# Patient Record
Sex: MALE | Race: OTHER | URBAN - METROPOLITAN AREA
[De-identification: names, ages, dates, MRNs, and addresses within clinical notes are randomized per-mention and may not be internally consistent; named-entity substitution may affect disease eponyms.]

---

## 2021-10-27 ENCOUNTER — INPATIENT (INPATIENT)
Facility: HOSPITAL | Age: 26
LOS: 1 days | Discharge: HOME | DRG: 558 | End: 2021-10-29
Payer: COMMERCIAL

## 2021-10-27 VITALS
WEIGHT: 154.98 LBS | RESPIRATION RATE: 17 BRPM | TEMPERATURE: 98 F | SYSTOLIC BLOOD PRESSURE: 107 MMHG | OXYGEN SATURATION: 97 % | HEIGHT: 75 IN | DIASTOLIC BLOOD PRESSURE: 66 MMHG | HEART RATE: 79 BPM

## 2021-10-27 PROCEDURE — 99285 EMERGENCY DEPT VISIT HI MDM: CPT

## 2021-10-27 PROCEDURE — 93010 ELECTROCARDIOGRAM REPORT: CPT

## 2021-10-27 RX ORDER — ONDANSETRON 8 MG/1
4 TABLET, FILM COATED ORAL EVERY 8 HOURS
Refills: 0 | Status: DISCONTINUED | OUTPATIENT
Start: 2021-10-27 | End: 2021-10-29

## 2021-10-27 RX ORDER — LANOLIN ALCOHOL/MO/W.PET/CERES
5 CREAM (GRAM) TOPICAL AT BEDTIME
Refills: 0 | Status: DISCONTINUED | OUTPATIENT
Start: 2021-10-27 | End: 2021-10-29

## 2021-10-27 RX ORDER — SODIUM CHLORIDE 9 MG/ML
1000 INJECTION INTRAMUSCULAR; INTRAVENOUS; SUBCUTANEOUS ONCE
Refills: 0 | Status: COMPLETED | OUTPATIENT
Start: 2021-10-27 | End: 2021-10-27

## 2021-10-27 RX ADMIN — SODIUM CHLORIDE 1000 MILLILITER(S): 9 INJECTION INTRAMUSCULAR; INTRAVENOUS; SUBCUTANEOUS at 23:50

## 2021-10-27 RX ADMIN — SODIUM CHLORIDE 1000 MILLILITER(S): 9 INJECTION INTRAMUSCULAR; INTRAVENOUS; SUBCUTANEOUS at 22:02

## 2021-10-27 RX ADMIN — SODIUM CHLORIDE 1000 MILLILITER(S): 9 INJECTION INTRAMUSCULAR; INTRAVENOUS; SUBCUTANEOUS at 23:53

## 2021-10-27 NOTE — ED PROVIDER NOTE - OBJECTIVE STATEMENT
26M with no sig PMHx who was sent in from  after he went there 3 days ago for muscle soreness after he worked out heavily one week ago. Pt also admits to not drinking a lot of water and notes dark urine. he was Rx naproxen at  which he has been taking for muscle aches. No f/c, no cp/sob, no n/v, no abd pain, no ha or neck pain, no dizziness or syncope, no n/t/w in ext. No other complaints.

## 2021-10-27 NOTE — ED ADULT TRIAGE NOTE - CHIEF COMPLAINT QUOTE
pt sent from  for creatine kinase >5000. pt went to  monday night for muscle aches and got lab results back today. offers no complaints, muscle aches relieved with naproxen

## 2021-10-27 NOTE — ED PROVIDER NOTE - CLINICAL SUMMARY MEDICAL DECISION MAKING FREE TEXT BOX
26M with no sig PMHx who was sent in from  after he went there 3 days ago for muscle soreness after he worked out heavily one week ago. Pt also admits to not drinking a lot of water and notes dark urine. he was Rx naproxen at  which he has been taking for muscle aches. No f/c, no cp/sob, no n/v, no abd pain, no ha or neck pain, no dizziness or syncope, no n/t/w in ext. No other complaints.    Pt is well-appearing on exam, VSS, no focal neuro deficits, EKG NSR, no ischemia.  Will check labs and hydrate.  Dispo pending workup 26M with no sig PMHx who was sent in from  after he went there 3 days ago for muscle soreness after he worked out heavily one week ago. Pt also admits to not drinking a lot of water and notes dark urine. he was Rx naproxen at  which he has been taking for muscle aches. No f/c, no cp/sob, no n/v, no abd pain, no ha or neck pain, no dizziness or syncope, no n/t/w in ext. No other complaints.    Pt is well-appearing on exam, VSS, no focal neuro deficits, EKG NSR, no ischemia.  Will check labs and hydrate.  Dispo pending workup    CK and LFTs elevated, pt will require admission for continued hydration and trending of CK, LFTs and kidney function.

## 2021-10-27 NOTE — ED ADULT NURSE NOTE - OBJECTIVE STATEMENT
Pt, with no PMH, presents to ER with generalized body aches s/p lifting weights on Thursday night. Pt went to  and was notified to come to ER d/t elevated CK result. Pt reports "tea colored" urine. Denies any other associated symptoms at this time.

## 2021-10-27 NOTE — ED ADULT NURSE REASSESSMENT NOTE - NS ED NURSE REASSESS COMMENT FT1
Pt ambulatory to restroom and back to stretcher with steady gait, clear yellow urine sample collected, sent to lab, waiting results.

## 2021-10-27 NOTE — ED ADULT NURSE REASSESSMENT NOTE - NS ED NURSE REASSESS COMMENT FT1
Pt, with no PMH, presents to ER room 17 with generalized body aches s/p lifting weights on Thursday night. Pt went to  and was notified to come to ER d/t elevated CK result. Pt reports "tea colored" urine. Denies any other associated symptoms at this time. Assessment as noted, IV access established, labs drawn and sent, waiting results. EKG complete, presented to ER physician for interpretation.

## 2021-10-28 DIAGNOSIS — R74.01 ELEVATION OF LEVELS OF LIVER TRANSAMINASE LEVELS: ICD-10-CM

## 2021-10-28 DIAGNOSIS — R63.8 OTHER SYMPTOMS AND SIGNS CONCERNING FOOD AND FLUID INTAKE: ICD-10-CM

## 2021-10-28 DIAGNOSIS — M62.82 RHABDOMYOLYSIS: ICD-10-CM

## 2021-10-28 LAB
ALBUMIN SERPL ELPH-MCNC: 3.4 G/DL — SIGNIFICANT CHANGE UP (ref 3.3–5)
ALP SERPL-CCNC: 75 U/L — SIGNIFICANT CHANGE UP (ref 40–120)
ALT FLD-CCNC: 160 U/L — HIGH (ref 10–45)
AMPHET UR-MCNC: NEGATIVE — SIGNIFICANT CHANGE UP
ANION GAP SERPL CALC-SCNC: 4 MMOL/L — LOW (ref 5–17)
ANION GAP SERPL CALC-SCNC: 6 MMOL/L — SIGNIFICANT CHANGE UP (ref 5–17)
APPEARANCE UR: CLEAR — SIGNIFICANT CHANGE UP
AST SERPL-CCNC: 614 U/L — HIGH (ref 10–40)
BARBITURATES UR SCN-MCNC: NEGATIVE — SIGNIFICANT CHANGE UP
BASOPHILS # BLD AUTO: 0.04 K/UL — SIGNIFICANT CHANGE UP (ref 0–0.2)
BASOPHILS NFR BLD AUTO: 0.9 % — SIGNIFICANT CHANGE UP (ref 0–2)
BENZODIAZ UR-MCNC: NEGATIVE — SIGNIFICANT CHANGE UP
BILIRUB SERPL-MCNC: 0.4 MG/DL — SIGNIFICANT CHANGE UP (ref 0.2–1.2)
BILIRUB UR-MCNC: NEGATIVE — SIGNIFICANT CHANGE UP
BUN SERPL-MCNC: 12 MG/DL — SIGNIFICANT CHANGE UP (ref 7–23)
BUN SERPL-MCNC: 12 MG/DL — SIGNIFICANT CHANGE UP (ref 7–23)
CALCIUM SERPL-MCNC: 8.4 MG/DL — SIGNIFICANT CHANGE UP (ref 8.4–10.5)
CALCIUM SERPL-MCNC: 8.4 MG/DL — SIGNIFICANT CHANGE UP (ref 8.4–10.5)
CHLORIDE SERPL-SCNC: 110 MMOL/L — HIGH (ref 96–108)
CHLORIDE SERPL-SCNC: 111 MMOL/L — HIGH (ref 96–108)
CK SERPL-CCNC: CRITICAL HIGH U/L (ref 30–200)
CO2 SERPL-SCNC: 25 MMOL/L — SIGNIFICANT CHANGE UP (ref 22–31)
CO2 SERPL-SCNC: 26 MMOL/L — SIGNIFICANT CHANGE UP (ref 22–31)
COCAINE METAB.OTHER UR-MCNC: NEGATIVE — SIGNIFICANT CHANGE UP
COLOR SPEC: YELLOW — SIGNIFICANT CHANGE UP
COVID-19 SPIKE DOMAIN AB INTERP: POSITIVE
COVID-19 SPIKE DOMAIN ANTIBODY RESULT: >250 U/ML — HIGH
CREAT SERPL-MCNC: 0.89 MG/DL — SIGNIFICANT CHANGE UP (ref 0.5–1.3)
CREAT SERPL-MCNC: 0.9 MG/DL — SIGNIFICANT CHANGE UP (ref 0.5–1.3)
DIFF PNL FLD: NEGATIVE — SIGNIFICANT CHANGE UP
EOSINOPHIL # BLD AUTO: 0.31 K/UL — SIGNIFICANT CHANGE UP (ref 0–0.5)
EOSINOPHIL NFR BLD AUTO: 6.9 % — HIGH (ref 0–6)
GLUCOSE SERPL-MCNC: 91 MG/DL — SIGNIFICANT CHANGE UP (ref 70–99)
GLUCOSE SERPL-MCNC: 93 MG/DL — SIGNIFICANT CHANGE UP (ref 70–99)
GLUCOSE UR QL: NEGATIVE — SIGNIFICANT CHANGE UP
HCT VFR BLD CALC: 36.4 % — LOW (ref 39–50)
HCT VFR BLD CALC: 37.2 % — LOW (ref 39–50)
HGB BLD-MCNC: 12.1 G/DL — LOW (ref 13–17)
HGB BLD-MCNC: 12.2 G/DL — LOW (ref 13–17)
IMM GRANULOCYTES NFR BLD AUTO: 0.2 % — SIGNIFICANT CHANGE UP (ref 0–1.5)
KETONES UR-MCNC: NEGATIVE — SIGNIFICANT CHANGE UP
LEUKOCYTE ESTERASE UR-ACNC: NEGATIVE — SIGNIFICANT CHANGE UP
LYMPHOCYTES # BLD AUTO: 2.11 K/UL — SIGNIFICANT CHANGE UP (ref 1–3.3)
LYMPHOCYTES # BLD AUTO: 47 % — HIGH (ref 13–44)
MAGNESIUM SERPL-MCNC: 2 MG/DL — SIGNIFICANT CHANGE UP (ref 1.6–2.6)
MCHC RBC-ENTMCNC: 29.7 PG — SIGNIFICANT CHANGE UP (ref 27–34)
MCHC RBC-ENTMCNC: 29.7 PG — SIGNIFICANT CHANGE UP (ref 27–34)
MCHC RBC-ENTMCNC: 32.8 GM/DL — SIGNIFICANT CHANGE UP (ref 32–36)
MCHC RBC-ENTMCNC: 33.2 GM/DL — SIGNIFICANT CHANGE UP (ref 32–36)
MCV RBC AUTO: 89.2 FL — SIGNIFICANT CHANGE UP (ref 80–100)
MCV RBC AUTO: 90.5 FL — SIGNIFICANT CHANGE UP (ref 80–100)
METHADONE UR-MCNC: NEGATIVE — SIGNIFICANT CHANGE UP
MONOCYTES # BLD AUTO: 0.37 K/UL — SIGNIFICANT CHANGE UP (ref 0–0.9)
MONOCYTES NFR BLD AUTO: 8.2 % — SIGNIFICANT CHANGE UP (ref 2–14)
NEUTROPHILS # BLD AUTO: 1.65 K/UL — LOW (ref 1.8–7.4)
NEUTROPHILS NFR BLD AUTO: 36.8 % — LOW (ref 43–77)
NITRITE UR-MCNC: NEGATIVE — SIGNIFICANT CHANGE UP
NRBC # BLD: 0 /100 WBCS — SIGNIFICANT CHANGE UP (ref 0–0)
NRBC # BLD: 0 /100 WBCS — SIGNIFICANT CHANGE UP (ref 0–0)
OPIATES UR-MCNC: NEGATIVE — SIGNIFICANT CHANGE UP
PCP SPEC-MCNC: SIGNIFICANT CHANGE UP
PCP UR-MCNC: NEGATIVE — SIGNIFICANT CHANGE UP
PH UR: 7 — SIGNIFICANT CHANGE UP (ref 5–8)
PHOSPHATE SERPL-MCNC: 4 MG/DL — SIGNIFICANT CHANGE UP (ref 2.5–4.5)
PLATELET # BLD AUTO: 201 K/UL — SIGNIFICANT CHANGE UP (ref 150–400)
PLATELET # BLD AUTO: 203 K/UL — SIGNIFICANT CHANGE UP (ref 150–400)
POTASSIUM SERPL-MCNC: 4.3 MMOL/L — SIGNIFICANT CHANGE UP (ref 3.5–5.3)
POTASSIUM SERPL-MCNC: 4.5 MMOL/L — SIGNIFICANT CHANGE UP (ref 3.5–5.3)
POTASSIUM SERPL-SCNC: 4.3 MMOL/L — SIGNIFICANT CHANGE UP (ref 3.5–5.3)
POTASSIUM SERPL-SCNC: 4.5 MMOL/L — SIGNIFICANT CHANGE UP (ref 3.5–5.3)
PROT SERPL-MCNC: 5.6 G/DL — LOW (ref 6–8.3)
PROT UR-MCNC: NEGATIVE MG/DL — SIGNIFICANT CHANGE UP
RBC # BLD: 4.08 M/UL — LOW (ref 4.2–5.8)
RBC # BLD: 4.11 M/UL — LOW (ref 4.2–5.8)
RBC # FLD: 12.3 % — SIGNIFICANT CHANGE UP (ref 10.3–14.5)
RBC # FLD: 12.3 % — SIGNIFICANT CHANGE UP (ref 10.3–14.5)
SARS-COV-2 IGG+IGM SERPL QL IA: >250 U/ML — HIGH
SARS-COV-2 IGG+IGM SERPL QL IA: POSITIVE
SARS-COV-2 RNA SPEC QL NAA+PROBE: NEGATIVE — SIGNIFICANT CHANGE UP
SODIUM SERPL-SCNC: 141 MMOL/L — SIGNIFICANT CHANGE UP (ref 135–145)
SODIUM SERPL-SCNC: 141 MMOL/L — SIGNIFICANT CHANGE UP (ref 135–145)
SP GR SPEC: 1.01 — SIGNIFICANT CHANGE UP (ref 1–1.03)
THC UR QL: POSITIVE
UROBILINOGEN FLD QL: 0.2 E.U./DL — SIGNIFICANT CHANGE UP
WBC # BLD: 4.49 K/UL — SIGNIFICANT CHANGE UP (ref 3.8–10.5)
WBC # BLD: 4.51 K/UL — SIGNIFICANT CHANGE UP (ref 3.8–10.5)
WBC # FLD AUTO: 4.49 K/UL — SIGNIFICANT CHANGE UP (ref 3.8–10.5)
WBC # FLD AUTO: 4.51 K/UL — SIGNIFICANT CHANGE UP (ref 3.8–10.5)

## 2021-10-28 PROCEDURE — 99222 1ST HOSP IP/OBS MODERATE 55: CPT | Mod: GC

## 2021-10-28 RX ORDER — ACETAMINOPHEN 500 MG
650 TABLET ORAL EVERY 6 HOURS
Refills: 0 | Status: DISCONTINUED | OUTPATIENT
Start: 2021-10-28 | End: 2021-10-29

## 2021-10-28 RX ORDER — SODIUM CHLORIDE 9 MG/ML
1000 INJECTION INTRAMUSCULAR; INTRAVENOUS; SUBCUTANEOUS
Refills: 0 | Status: DISCONTINUED | OUTPATIENT
Start: 2021-10-28 | End: 2021-10-28

## 2021-10-28 RX ORDER — SODIUM CHLORIDE 9 MG/ML
1000 INJECTION INTRAMUSCULAR; INTRAVENOUS; SUBCUTANEOUS
Refills: 0 | Status: DISCONTINUED | OUTPATIENT
Start: 2021-10-28 | End: 2021-10-29

## 2021-10-28 RX ORDER — INFLUENZA VIRUS VACCINE 15; 15; 15; 15 UG/.5ML; UG/.5ML; UG/.5ML; UG/.5ML
0.5 SUSPENSION INTRAMUSCULAR ONCE
Refills: 0 | Status: DISCONTINUED | OUTPATIENT
Start: 2021-10-28 | End: 2021-10-29

## 2021-10-28 RX ADMIN — SODIUM CHLORIDE 300 MILLILITER(S): 9 INJECTION INTRAMUSCULAR; INTRAVENOUS; SUBCUTANEOUS at 18:27

## 2021-10-28 RX ADMIN — SODIUM CHLORIDE 200 MILLILITER(S): 9 INJECTION INTRAMUSCULAR; INTRAVENOUS; SUBCUTANEOUS at 00:31

## 2021-10-28 RX ADMIN — SODIUM CHLORIDE 300 MILLILITER(S): 9 INJECTION INTRAMUSCULAR; INTRAVENOUS; SUBCUTANEOUS at 10:34

## 2021-10-28 RX ADMIN — SODIUM CHLORIDE 200 MILLILITER(S): 9 INJECTION INTRAMUSCULAR; INTRAVENOUS; SUBCUTANEOUS at 07:18

## 2021-10-28 RX ADMIN — SODIUM CHLORIDE 200 MILLILITER(S): 9 INJECTION INTRAMUSCULAR; INTRAVENOUS; SUBCUTANEOUS at 02:56

## 2021-10-28 RX ADMIN — SODIUM CHLORIDE 300 MILLILITER(S): 9 INJECTION INTRAMUSCULAR; INTRAVENOUS; SUBCUTANEOUS at 11:56

## 2021-10-28 RX ADMIN — SODIUM CHLORIDE 300 MILLILITER(S): 9 INJECTION INTRAMUSCULAR; INTRAVENOUS; SUBCUTANEOUS at 22:26

## 2021-10-28 RX ADMIN — SODIUM CHLORIDE 300 MILLILITER(S): 9 INJECTION INTRAMUSCULAR; INTRAVENOUS; SUBCUTANEOUS at 15:22

## 2021-10-28 NOTE — H&P ADULT - PROBLEM SELECTOR PLAN 1
pt endorsing muscle pain/soreness after exercise. found to have CK > 72149, CKMB 42.2, dark urine, UA w.o blood or RBCs. differentials include trauma/muscle injury (most likely given hx and poor fluid intake vs myopathies vs thermal injuries vs drugs/toxins vs electrolyte ab vs endocrine disorders   -s/p 2L NS in ed  -c/w maintenance at 200cc/hr (titrate to goal -300cc/hr)  -f/u utox pt endorsing muscle pain/soreness after exercise. found to have CK > 36108, CKMB 42.2, dark urine, UA w.o blood or RBCs. differentials include trauma/muscle injury (most likely given hx and poor fluid intake vs myopathies vs thermal injuries vs drugs/toxins vs electrolyte ab vs endocrine disorders   -s/p 2L NS in ed  -c/w maintenance at 200cc/hr (titrate to goal -300cc/hr)  -monitor for signs of compartment syndrome   -f/u utox  -trend CK

## 2021-10-28 NOTE — H&P ADULT - PROBLEM SELECTOR PLAN 3
Fluids: s/p 2L NS, 200cc/hr maintenance   Electrolytes: Mg>2, K>4  Nutrition:  No IVF currently needed, replete lytes PRN  Prophylaxis: SCD  Activity: AAT, OOBTC  GI: none  C: FC  Dispo: Admit to F

## 2021-10-28 NOTE — DISCHARGE NOTE PROVIDER - NSDCCPCAREPLAN_GEN_ALL_CORE_FT
PRINCIPAL DISCHARGE DIAGNOSIS  Diagnosis: Rhabdomyolysis  Assessment and Plan of Treatment: You were found to have rhabdomyolysis, which is a condition that occurs when a large amount of muscle is damaged. When muscle fibers break down, they release their contents into the bloodstream. One of these is a protein called myoglobin. Myoglobin can damage the kidneys, so your kidney function was closely monitored throughout your admission. Other substances released by damaged muscles can cause chemical and fluid imbalance in the body.   Causes of rhabdomyolysis include:   What causes rhabdomyolysis?   Causes include:  - Extended overexertion of the muscles (such as during marathon running)  - Trauma (such as a car accident), especially crush injuries  - Blockage of an artery or vein that leads to muscle death (such as deep vein thrombosis)  - Certain prescription medicines, such as statins, or other drugs (including alcohol)  - Infection  - Heat stroke  - Prolonged immobilization  - Severe burns  - Metabolic imbalances  - Inflammatory muscle diseases such as polymyositis  - Certain genetic disorders  You received IV fluids to help prevent damage to your kidney, and to help excrete muscle breakdown products from your body. With early treatment, the kidneys often recover without long-term damage. Please follow up with your primary care provider.   Seek medical attention immediately if you have any of these symptoms:  - Swelling of the hands and feet  - Trouble breathing  - Abnormal heartbeat  - Unexplained bleeding      SECONDARY DISCHARGE DIAGNOSES  Diagnosis: Myalgia, upper arm  Assessment and Plan of Treatment:      PRINCIPAL DISCHARGE DIAGNOSIS  Diagnosis: Rhabdomyolysis  Assessment and Plan of Treatment: You were found to have rhabdomyolysis, which is a condition that occurs when a large amount of muscle is damaged. When muscle fibers break down, they release their contents into the bloodstream. One of these is a protein called myoglobin. Myoglobin can damage the kidneys, so your kidney function was closely monitored throughout your admission. Other substances released by damaged muscles can cause chemical and fluid imbalance in the body.   Causes of rhabdomyolysis include:   What causes rhabdomyolysis?   Causes include:  - Extended overexertion of the muscles (such as during marathon running)  - Trauma (such as a car accident), especially crush injuries  - Blockage of an artery or vein that leads to muscle death (such as deep vein thrombosis)  - Certain prescription medicines, such as statins, or other drugs (including alcohol)  - Infection  - Heat stroke  - Prolonged immobilization  - Severe burns  - Metabolic imbalances  - Inflammatory muscle diseases such as polymyositis  - Certain genetic disorders  You received IV fluids to help prevent damage to your kidney, and to help excrete muscle breakdown products from your body. With early treatment, the kidneys often recover without long-term damage. Please follow up with your primary care provider.   Seek medical attention immediately if you have any of these symptoms:  - Swelling of the hands and feet  - Trouble breathing  - Abnormal heartbeat  - Unexplained bleeding

## 2021-10-28 NOTE — H&P ADULT - HISTORY OF PRESENT ILLNESS
HPI: 26M with no sig PMHx who was sent in from  after he went there 3 days ago for muscle soreness after he worked out heavily one week ago. Pt also admits to not drinking a lot of water and notes dark urine. he was Rx naproxen at  which he has been taking for muscle aches. No f/c, no cp/sob, no n/v, no abd pain, no ha or neck pain, no dizziness or syncope, no n/t/w in ext. No other complaints.    In the ED:  Initial vital signs: T: 98F, HR: 79, BP: 107/66, R: 17, SpO2: 97% on RA  Labs: significant for cbc wnl, lytes wnl, AST//198, CK > 04573, CKMB 42.2, trop 0.01, UA wnl w.o signs of infection, no RBCs  Imaging: none   CXR: none  EKG: NSR   Medications: 2L NS  Consults: none      HPI: 26M with no sig PMHx who was sent in from  after he went there 3 days ago for muscle soreness after he worked out heavily one week ago. Pt also admits to not drinking a lot of water and notes dark urine. he was Rx naproxen at  which he has been taking for muscle aches. he denies drug use other than marijuana occasionally. denies hx of autoimmune conditions or myopathy. no pertinent family hx. ROS negative for f/c, no cp/sob, no n/v, no abd pain, no ha or neck pain, no dizziness or syncope, no n/t/w in ext. No other complaints.    In the ED:  Initial vital signs: T: 98F, HR: 79, BP: 107/66, R: 17, SpO2: 97% on RA  Labs: significant for cbc wnl, lytes wnl, AST//198, CK > 12425, CKMB 42.2, trop 0.01, UA wnl w.o signs of infection, no RBCs  Imaging: none   CXR: none  EKG: NSR   Medications: 2L NS  Consults: none

## 2021-10-28 NOTE — DISCHARGE NOTE PROVIDER - HOSPITAL COURSE
#Discharge: do not delete     Patient is 25 yo M with no known past medical history sent in from  after presenting there 3 days ago for muscle soreness and dark urine after working out heavily 1 week ago, found to have elevated CK >48,000, admitted to tx of rhabdomyolysis     Hospital course (by problem):     #Rhabdomyolysis  Pt endorsing muscle pain/soreness after exercising for first time in months (3 sets of 20x pushups), found to have CK > 74443, CKMB 42.2, dark urine, UA w/o blood or RBCs. Most likely 2/2 muscle injury i/s/o dehydration (given hx, poor fluid intake, and timing of onset of sx) vs less likely drug/toxin-induced vs unlikely immune vs inflammatory myopathy   -Pt received 2LNS in ED  -continued on NS at 200-300cc/hr  -no signs of compartment syndrome   -Utox +MJ, negative all other substances  -trend CK: 14522 (10/28)<-65627 (10/27)  -follow up CK as outpatient     #Transaminitis  AST//198 at presentation, likely 2/2 rhabdomyolysis vs less likely hepatitis, pt w/o abdominal complaints throughout admission   -management of rhabdomyolysis as above  -AST/ALT downtrendin/160 (10/28)<-828/198 (10/27)  -follow up as outpatient    Patient was discharged to: home    New medications: none  Changes to old medications: none  Medications that were stopped: none    Items to follow up as outpatient: BMP, CK     Physical exam at the time of discharge:    GENERAL: young male lying in bed comfortably in NAD  HEAD: Atraumatic, normocephalic  EYES: EOMI, PERRLA, conjunctiva and sclera clear  ENT: Moist mucous membranes  NECK: Supple, no JVD  CHEST/LUNG: CTAB; no rales, rhonchi, wheezing, or rubs. Unlabored respirations.  HEART: Regular rate and rhythm; no murmurs, rubs, or gallops  ABDOMEN: BS normoactive x4 quadrants; soft, nontender, nondistended  EXTREMITIES: 2+ peripheral pulses, brisk capillary refill. No clubbing, cyanosis, or edema  NERVOUS SYSTEM: AOx3, no focal deficits, CN II-XII grossly intact  SKIN: No rashes or lesions noted       #Discharge: do not delete     Patient is 25 yo M with no known past medical history sent in from  after presenting there 3 days ago for muscle soreness and dark urine after working out heavily 1 week ago, found to have elevated CK >48,000, admitted to tx of rhabdomyolysis     Hospital course (by problem):     #Rhabdomyolysis  Pt endorsing muscle pain/soreness after exercising for first time in months (3 sets of 20x pushups), found to have CK > 16981, CKMB 42.2, dark urine, UA w/o blood or RBCs. Most likely 2/2 muscle injury i/s/o dehydration (given hx, poor fluid intake, and timing of onset of sx) vs less likely drug/toxin-induced vs unlikely immune vs inflammatory myopathy   -Pt received 2LNS in ED  -continued on NS at 200-300cc/hr  -no signs of compartment syndrome   -Utox +MJ, negative all other substances  -trend CK: 26,330 (10/29)<-01735 (10/28)<-01154 (10/27)  -follow up CK as outpatient     #Transaminitis  AST//198 at presentation, likely 2/2 rhabdomyolysis vs less likely hepatitis, pt w/o abdominal complaints throughout admission   -management of rhabdomyolysis as above  -AST/ALT downtrendin/160 (10/28)<-828/198 (10/27)  -follow up as outpatient    Patient was discharged to: home    New medications: none  Changes to old medications: none  Medications that were stopped: none    Items to follow up as outpatient: BMP, CK     Physical exam at the time of discharge:    GENERAL: young male lying in bed comfortably in NAD  HEAD: Atraumatic, normocephalic  EYES: EOMI, PERRLA, conjunctiva and sclera clear  ENT: Moist mucous membranes  NECK: Supple, no JVD  CHEST/LUNG: CTAB; no rales, rhonchi, wheezing, or rubs. Unlabored respirations.  HEART: Regular rate and rhythm; no murmurs, rubs, or gallops  ABDOMEN: BS normoactive x4 quadrants; soft, nontender, nondistended  EXTREMITIES: 2+ peripheral pulses, brisk capillary refill. No clubbing, cyanosis, or edema  NERVOUS SYSTEM: AOx3, no focal deficits, CN II-XII grossly intact  SKIN: No rashes or lesions noted

## 2021-10-28 NOTE — H&P ADULT - NSHPSOCIALHISTORY_GEN_ALL_CORE
Work:  Tobacco use:   EtOH use:  Illicit drug use:    Living situation: Work: employed   Tobacco use: x  EtOH use: 1-2 times/month   Illicit drug use: marijuana every other day    [place]

## 2021-10-28 NOTE — DISCHARGE NOTE PROVIDER - NSDCFUADDAPPT_GEN_ALL_CORE_FT
Please make a follow up appointment with your Primary Care Provider (PCP) within 2 weeks. If you do not have a PCP, you are welcome to follow up with us at our [facility] located at [address] [place] [address], [address] [place] [address]. You can call [phone] to schedule an appointment.

## 2021-10-28 NOTE — H&P ADULT - ATTENDING COMMENTS
Patient was seen and examined with the resident team today.  I agree with Dr. Wheeler's assessment and plan with the following exceptions/additions:     Briefly, this is a 25yo gentleman with no significant PMH p/w BUE soreness s/p working out 1 week ago, found to have rhabdomyolysis at urgent care.     #Non-traumatic rhabdomyolysis - increase IVF's to 300cc/hr and re-check BMP Friday morning, monitor appearance of urine and I/O's   #DVT PPx - amublatory  #Dispo - home Friday     Daisy Berry  [phone]

## 2021-10-28 NOTE — H&P ADULT - PROBLEM SELECTOR PLAN 2
p/w AST//198. likely in setting of rhabdo vs hepatitis. pt w.o abdominal complaints   -cont tx as above for rhabdo  -if not downtrending would r/o hepatitis and obtain RUQ U/S

## 2021-10-28 NOTE — H&P ADULT - NSHPLABSRESULTS_GEN_ALL_CORE
.  LABS:                         13.4   4.52  )-----------( 229      ( 27 Oct 2021 22:15 )             39.1     10-27    139  |  106  |  15  ----------------------------<  109<H>  4.1   |  25  |  0.83    Ca    8.8      27 Oct 2021 22:15    TPro  6.7  /  Alb  4.0  /  TBili  0.6  /  DBili  x   /  AST  828<H>  /  ALT  198<H>  /  AlkPhos  90  10-27      Urinalysis Basic - ( 27 Oct 2021 23:27 )    Color: Yellow / Appearance: Clear / S.025 / pH: x  Gluc: x / Ketone: NEGATIVE  / Bili: Negative / Urobili: 1.0 E.U./dL   Blood: x / Protein: NEGATIVE mg/dL / Nitrite: NEGATIVE   Leuk Esterase: NEGATIVE / RBC: x / WBC x   Sq Epi: x / Non Sq Epi: x / Bacteria: x            RADIOLOGY, EKG & ADDITIONAL TESTS: Reviewed.

## 2021-10-28 NOTE — DISCHARGE NOTE PROVIDER - NSFOLLOWUPCLINICS_GEN_ALL_ED_FT
[facility]  Family Medicine  [address]  [place], [place] [address]  Phone: [phone]  Fax:   Follow Up Time: 2 weeks

## 2021-10-29 VITALS
SYSTOLIC BLOOD PRESSURE: 131 MMHG | TEMPERATURE: 98 F | RESPIRATION RATE: 15 BRPM | HEART RATE: 71 BPM | DIASTOLIC BLOOD PRESSURE: 72 MMHG | OXYGEN SATURATION: 99 %

## 2021-10-29 LAB — CK SERPL-CCNC: SIGNIFICANT CHANGE UP U/L (ref 30–200)

## 2021-10-29 PROCEDURE — 86769 SARS-COV-2 COVID-19 ANTIBODY: CPT

## 2021-10-29 PROCEDURE — 84484 ASSAY OF TROPONIN QUANT: CPT

## 2021-10-29 PROCEDURE — 85025 COMPLETE CBC W/AUTO DIFF WBC: CPT

## 2021-10-29 PROCEDURE — 82550 ASSAY OF CK (CPK): CPT

## 2021-10-29 PROCEDURE — 85027 COMPLETE CBC AUTOMATED: CPT

## 2021-10-29 PROCEDURE — 80307 DRUG TEST PRSMV CHEM ANLYZR: CPT

## 2021-10-29 PROCEDURE — 84100 ASSAY OF PHOSPHORUS: CPT

## 2021-10-29 PROCEDURE — 99239 HOSP IP/OBS DSCHRG MGMT >30: CPT | Mod: GC

## 2021-10-29 PROCEDURE — 83735 ASSAY OF MAGNESIUM: CPT

## 2021-10-29 PROCEDURE — 96360 HYDRATION IV INFUSION INIT: CPT

## 2021-10-29 PROCEDURE — 87635 SARS-COV-2 COVID-19 AMP PRB: CPT

## 2021-10-29 PROCEDURE — 80048 BASIC METABOLIC PNL TOTAL CA: CPT

## 2021-10-29 PROCEDURE — 81003 URINALYSIS AUTO W/O SCOPE: CPT

## 2021-10-29 PROCEDURE — 36415 COLL VENOUS BLD VENIPUNCTURE: CPT

## 2021-10-29 PROCEDURE — 96361 HYDRATE IV INFUSION ADD-ON: CPT

## 2021-10-29 PROCEDURE — 99285 EMERGENCY DEPT VISIT HI MDM: CPT

## 2021-10-29 PROCEDURE — 82553 CREATINE MB FRACTION: CPT

## 2021-10-29 PROCEDURE — 80053 COMPREHEN METABOLIC PANEL: CPT

## 2021-10-29 RX ADMIN — SODIUM CHLORIDE 300 MILLILITER(S): 9 INJECTION INTRAMUSCULAR; INTRAVENOUS; SUBCUTANEOUS at 05:04

## 2021-10-29 RX ADMIN — SODIUM CHLORIDE 300 MILLILITER(S): 9 INJECTION INTRAMUSCULAR; INTRAVENOUS; SUBCUTANEOUS at 08:12

## 2021-10-29 RX ADMIN — SODIUM CHLORIDE 300 MILLILITER(S): 9 INJECTION INTRAMUSCULAR; INTRAVENOUS; SUBCUTANEOUS at 11:41

## 2021-10-29 RX ADMIN — SODIUM CHLORIDE 300 MILLILITER(S): 9 INJECTION INTRAMUSCULAR; INTRAVENOUS; SUBCUTANEOUS at 01:42

## 2021-10-29 NOTE — DISCHARGE NOTE NURSING/CASE MANAGEMENT/SOCIAL WORK - PATIENT PORTAL LINK FT
You can access the FollowMyHealth Patient Portal offered by [facility] by registering at the following website: [email or web address]. By joining [facility]’s FollowMyHealth portal, you will also be able to view your health information using other applications (apps) compatible with our system.

## 2021-10-29 NOTE — DISCHARGE NOTE NURSING/CASE MANAGEMENT/SOCIAL WORK - NSDCFUADDAPPT_GEN_ALL_CORE_FT
Please make a follow up appointment with your Primary Care Provider (PCP) within 2 weeks. If you do not have a PCP, you are welcome to follow up with us at our [facility] located at [address] [place] [address], [address] [place] [address]. You can call [phone] to schedule an appointment.

## 2021-11-03 DIAGNOSIS — M62.82 RHABDOMYOLYSIS: ICD-10-CM

## 2021-11-03 DIAGNOSIS — E86.0 DEHYDRATION: ICD-10-CM

## 2021-11-03 DIAGNOSIS — Z86.16 PERSONAL HISTORY OF COVID-19: ICD-10-CM

## 2024-01-18 NOTE — PATIENT PROFILE ADULT - NSPROGENOTHERPROVIDER_GEN_A_NUR
Detail Level: Simple Render Risk Assessment In Note?: no Additional Notes: Patient was treated with Elidel cream that helped temporarily, but plaque recurred several months later. Was given an antifungal cream a week ago that has not helped.\\nNo history of autoimmune conditions or family Hx of lupus. none
